# Patient Record
Sex: FEMALE | Race: OTHER | NOT HISPANIC OR LATINO
[De-identification: names, ages, dates, MRNs, and addresses within clinical notes are randomized per-mention and may not be internally consistent; named-entity substitution may affect disease eponyms.]

---

## 2023-03-01 PROBLEM — Z00.00 ENCOUNTER FOR PREVENTIVE HEALTH EXAMINATION: Status: ACTIVE | Noted: 2023-03-01

## 2023-03-03 ENCOUNTER — APPOINTMENT (OUTPATIENT)
Dept: OBGYN | Facility: CLINIC | Age: 48
End: 2023-03-03
Payer: COMMERCIAL

## 2023-03-03 VITALS — WEIGHT: 108 LBS | BODY MASS INDEX: 18.44 KG/M2 | HEIGHT: 64 IN | TEMPERATURE: 98.1 F

## 2023-03-03 DIAGNOSIS — Z87.39 PERSONAL HISTORY OF OTHER DISEASES OF THE MUSCULOSKELETAL SYSTEM AND CONNECTIVE TISSUE: ICD-10-CM

## 2023-03-03 DIAGNOSIS — Z87.891 PERSONAL HISTORY OF NICOTINE DEPENDENCE: ICD-10-CM

## 2023-03-03 PROCEDURE — 99386 PREV VISIT NEW AGE 40-64: CPT

## 2023-03-03 NOTE — HISTORY OF PRESENT ILLNESS
[FreeTextEntry1] : 48 yo G0 w/ LMP 2/12/23 here to establish care. Previously seeing Dr. Canseco. Has regular, non-painful menses q24 days. Does report significant anxiety and depression for the week prior to menses. This has been worsening over the past few years. Impacts her quality of life. Never took anything for it. Sexually active with casual partner, no issues. Sometimes uses condoms. Has long history of urinary urgency, tried several medications years ago. Follows with urologist. No menopausal symptoms. \par works in [employer] office in [place]\par \par Last pap about 1 year ago, normal per pt\par Last mammo 8/2022, normal per pt\par colonoscopy 10 years ago, normal per pt\par has history of osteoporosis, was taking fosamax for about 5 years. Stopped recently and repeated DEXA which worsened. Rheumatologist trying to approve prolia. \par follows with PCP for health care maintenance\par \par Ob hx: G0\par GYN denies cysts, fibroids, STIs\par h/o abnormal pap years ago, had colpo in the office x2\par PMH osteoporosis, history of eating disorder (likely anorexia)\par meds vitamins, probiotic\par NKDA\par PSH denies\par social former smoker >20yrs ago\par fam hx denies

## 2023-03-03 NOTE — PHYSICAL EXAM
[Appropriately responsive] : appropriately responsive [Alert] : alert [No Acute Distress] : no acute distress [No Lymphadenopathy] : no lymphadenopathy [Regular Rate Rhythm] : regular rate rhythm [No Murmurs] : no murmurs [Clear to Auscultation B/L] : clear to auscultation bilaterally [Soft] : soft [Non-tender] : non-tender [Non-distended] : non-distended [No HSM] : No HSM [No Lesions] : no lesions [No Mass] : no mass [Oriented x3] : oriented x3 [Examination Of The Breasts] : a normal appearance [No Masses] : no breast masses were palpable [Labia Majora] : normal [Labia Minora] : normal [Normal] : normal [Uterine Adnexae] : normal [FreeTextEntry6] : uterus deviated to left

## 2023-03-03 NOTE — DISCUSSION/SUMMARY
[FreeTextEntry1] : 48 yo G0, h/o osteoperosis, likely premenstrual dysphoric disorder, annual exam.\par \par - f/up pap, HPV\par - referral for mammo\par - discussed lifestyle modification for urinary urgency, if worsening consider repeat urogyn referral\par - discussed first line therapy for PMDD is typically OCPs. patient will think about it\par - encouraged weight bearing exercise, Ca/VitD supplementation, follows with rheumatology\par - health care maintenance with PCP\par - encouraged to have colonoscopy done per new screening guidelines\par - return to office 1 year annual exam or PRN

## 2023-03-07 LAB
CYTOLOGY CVX/VAG DOC THIN PREP: NORMAL
HPV HIGH+LOW RISK DNA PNL CVX: NOT DETECTED

## 2024-03-08 ENCOUNTER — APPOINTMENT (OUTPATIENT)
Dept: OBGYN | Facility: CLINIC | Age: 49
End: 2024-03-08

## 2024-04-19 ENCOUNTER — APPOINTMENT (OUTPATIENT)
Dept: OBGYN | Facility: CLINIC | Age: 49
End: 2024-04-19

## 2024-05-03 ENCOUNTER — APPOINTMENT (OUTPATIENT)
Dept: OBGYN | Facility: CLINIC | Age: 49
End: 2024-05-03
Payer: COMMERCIAL

## 2024-05-03 VITALS — DIASTOLIC BLOOD PRESSURE: 60 MMHG | SYSTOLIC BLOOD PRESSURE: 110 MMHG

## 2024-05-03 VITALS — WEIGHT: 106 LBS | HEIGHT: 64 IN | BODY MASS INDEX: 18.1 KG/M2

## 2024-05-03 DIAGNOSIS — Z01.419 ENCOUNTER FOR GYNECOLOGICAL EXAMINATION (GENERAL) (ROUTINE) W/OUT ABNORMAL FINDINGS: ICD-10-CM

## 2024-05-03 DIAGNOSIS — Z20.2 CONTACT WITH AND (SUSPECTED) EXPOSURE TO INFECTIONS WITH A PREDOMINANTLY SEXUAL MODE OF TRANSMISSION: ICD-10-CM

## 2024-05-03 DIAGNOSIS — F32.81 PREMENSTRUAL DYSPHORIC DISORDER: ICD-10-CM

## 2024-05-03 PROCEDURE — 99396 PREV VISIT EST AGE 40-64: CPT

## 2024-05-03 RX ORDER — ESCITALOPRAM OXALATE 5 MG/1
5 TABLET ORAL DAILY
Qty: 90 | Refills: 3 | Status: ACTIVE | COMMUNITY
Start: 2024-05-03 | End: 1900-01-01

## 2024-05-03 NOTE — DISCUSSION/SUMMARY
He is up to date on immunizations. He is up to date on colonoscopy, which was discontinued by his gastroenterologist after the one in 04/2023.   [FreeTextEntry1] : 49 yo G0 LMP 4/18/24 with PMDD and normal annual exam - Pap smear and HPV collected - Discussed PMDD, will resolve with menopause but that may be several years away. Discussed treatments with OCPs and/or SSRI. She prefers not to use OCPs. Discussed lexapro,s ent 5mg to pharmacy, discussed proper use. call in 6 weeks to discuss her progress to see if dose needs to be adjusted. - Discussed pregnancy, can still get pregnant, she declined contraception - Patient would like STI screening, collected GC/CT, sent for HIV, syphilis, hepatitis - Mammogram referral given - Next annual 1 year

## 2024-05-03 NOTE — HISTORY OF PRESENT ILLNESS
[FreeTextEntry1] : 47 yo G0 LMP 4/18/24 presents for annual exam. She denies breast pain, breast changes, abdominal pain, pelvic pain, abnormal vaginal bleeding, vaginal discharge, dyspareunia, or urinary incontinence.  She has regular monthly cycles q24-26 days. She notes strong mood changes that are bothersome to her prior to menses. She has had this before but it has worsened over the last year. She was on OCPs years ago but does not want to use them again.  OB: Nulligravida GYN: Denies h/o fibroids, cysts or STIs. Regular, monthly menses H/o abnormal pap smears requiring colposcopy. Last pap smear 3/2023 NILM HPV negative PMH: Urinary urgency (follows with urology for years), Osteoporosis follows with Rheumatology PSH: Denies Meds: none  Social: Former smoker, quit >20 years ago, works in [employer] office

## 2024-05-03 NOTE — PHYSICAL EXAM
[Appropriately responsive] : appropriately responsive [Alert] : alert [No Acute Distress] : no acute distress [Soft] : soft [Non-tender] : non-tender [Non-distended] : non-distended [No Lesions] : no lesions [No Mass] : no mass [Oriented x3] : oriented x3 [Labia Majora] : normal [Labia Minora] : normal [No Bleeding] : There was no active vaginal bleeding [Normal] : normal [Tenderness] : nontender [Enlarged ___ wks] : not enlarged [Anteversion] : anteverted [Mass ___ cm] : no uterine mass was palpated [Uterine Adnexae] : normal

## 2024-05-07 ENCOUNTER — NON-APPOINTMENT (OUTPATIENT)
Age: 49
End: 2024-05-07

## 2024-05-07 LAB
C TRACH RRNA SPEC QL NAA+PROBE: NOT DETECTED
CYTOLOGY CVX/VAG DOC THIN PREP: NORMAL
HBV SURFACE AG SER QL: NONREACTIVE
HCV AB SER QL: NONREACTIVE
HCV S/CO RATIO: 0.06 S/CO
HIV1+2 AB SPEC QL IA.RAPID: NONREACTIVE
HPV HIGH+LOW RISK DNA PNL CVX: NOT DETECTED
N GONORRHOEA RRNA SPEC QL NAA+PROBE: NOT DETECTED
SOURCE TP AMPLIFICATION: NORMAL
T PALLIDUM AB SER QL IA: NEGATIVE

## 2025-07-18 ENCOUNTER — NON-APPOINTMENT (OUTPATIENT)
Age: 50
End: 2025-07-18

## 2025-07-22 ENCOUNTER — APPOINTMENT (OUTPATIENT)
Dept: OBGYN | Facility: CLINIC | Age: 50
End: 2025-07-22
Payer: COMMERCIAL

## 2025-07-22 ENCOUNTER — TRANSCRIPTION ENCOUNTER (OUTPATIENT)
Age: 50
End: 2025-07-22

## 2025-07-22 VITALS
BODY MASS INDEX: 18.44 KG/M2 | HEIGHT: 64 IN | WEIGHT: 108 LBS | SYSTOLIC BLOOD PRESSURE: 110 MMHG | DIASTOLIC BLOOD PRESSURE: 68 MMHG

## 2025-07-22 DIAGNOSIS — Z00.00 ENCOUNTER FOR GENERAL ADULT MEDICAL EXAMINATION W/OUT ABNORMAL FINDINGS: ICD-10-CM

## 2025-07-22 DIAGNOSIS — Z01.419 ENCOUNTER FOR GYNECOLOGICAL EXAMINATION (GENERAL) (ROUTINE) W/OUT ABNORMAL FINDINGS: ICD-10-CM

## 2025-07-22 LAB
APPEARANCE: CLEAR
BILIRUBIN URINE: NEGATIVE
BLOOD URINE: ABNORMAL
COLOR: YELLOW
GLUCOSE QUALITATIVE U: NEGATIVE
KETONES URINE: NEGATIVE
LEUKOCYTE ESTERASE URINE: NEGATIVE
NITRITE URINE: NEGATIVE
PH URINE: 7
PROTEIN URINE: NEGATIVE
SPECIFIC GRAVITY URINE: 1.01
UROBILINOGEN URINE: 0.2 (ref 0.2–?)

## 2025-07-22 PROCEDURE — 99396 PREV VISIT EST AGE 40-64: CPT

## 2025-07-24 ENCOUNTER — NON-APPOINTMENT (OUTPATIENT)
Age: 50
End: 2025-07-24

## 2025-07-24 LAB
ESTRADIOL SERPL-MCNC: 415 PG/ML
FSH SERPL-MCNC: 9.4 IU/L
HPV HIGH+LOW RISK DNA PNL CVX: NOT DETECTED

## 2025-08-07 LAB — CYTOLOGY CVX/VAG DOC THIN PREP: NORMAL
